# Patient Record
(demographics unavailable — no encounter records)

---

## 2024-11-06 NOTE — REVIEW OF SYSTEMS
Adult [Hearing Loss] : hearing loss [Cough] : cough [Joint Pain] : joint pain [Negative] : Heme/Lymph

## 2024-11-13 NOTE — HEALTH RISK ASSESSMENT
[No falls in past year] : Patient reported no falls in the past year [Assistive Device] : Patient uses an assistive device [With Family] : lives with family [Feels Safe at Home] : Feels safe at home [Some assistance needed] : feeding [Full assistance needed] : walking [Reports changes in hearing] : Reports changes in hearing [de-identified] : wheelchair

## 2024-11-13 NOTE — HEALTH RISK ASSESSMENT
[No falls in past year] : Patient reported no falls in the past year [Assistive Device] : Patient uses an assistive device [With Family] : lives with family [Feels Safe at Home] : Feels safe at home [Some assistance needed] : feeding [Full assistance needed] : walking [Reports changes in hearing] : Reports changes in hearing [de-identified] : wheelchair

## 2024-11-13 NOTE — HISTORY OF PRESENT ILLNESS
[Post-hospitalization from ___ Hospital] : Post-hospitalization from [unfilled] Hospital [Admitted on: ___] : The patient was admitted on [unfilled] [Discharged on ___] : discharged on [unfilled] [Discharge Summary] : discharge summary [Pertinent Labs] : pertinent labs [Radiology Findings] : radiology findings [Discharge Med List] : discharge medication list [Med Reconciliation] : medication reconciliation has been completed [Patient Contacted By: ____] : and contacted by [unfilled] [FreeTextEntry3] : [facility] STAR Hospitalization Follow up  [FreeTextEntry2] : [facility]- Transitional Care Management Team (TCM)  Mrs. Davies is an 81 y/o woman with ESRD on HD (T,TH,S) , T2DM (diet controlled), HTN and RA who presented to [facility] with nausea, vomiting, abdominal pain and diarrhea for 3 days; missing one day of HD. CXR showed bilateral infiltrates. She was admitted and treated for pneumonia and hyperkalemia. Nephrology consulted. Hospital course: K+ 8.0: calcium gluconate, sodium bicarb, and lokelma were given, as well as emergent HD. Symptoms improved on antibiotics. Discharged home on antibiotics.   Mrs. Davies was seen in the home today. Her HHA was present during the home visit. She was transported into the living room via transport chair and with no signs of distress.  Currently, she reports that she remains with a non-productive cough and one episode of diarrhea this morning, but no abdominal pain, nausea, or vomiting. Her appetite is good. Denies fever/chills, increased fatigue, chest pain, and shortness of breath. Medications reviewed: missing aspirin, renvela, and losartan. Of note, she reports having home oxygen from previous hospitalization.   she is unable to provide the names of her nephrologist or rheumatologist PCP, Dr. Villareal ([facility]) uses Good day transportation services

## 2024-11-13 NOTE — HISTORY OF PRESENT ILLNESS
[Post-hospitalization from ___ Hospital] : Post-hospitalization from [unfilled] Hospital [Admitted on: ___] : The patient was admitted on [unfilled] [Discharged on ___] : discharged on [unfilled] [Discharge Summary] : discharge summary [Pertinent Labs] : pertinent labs [Radiology Findings] : radiology findings [Discharge Med List] : discharge medication list [Med Reconciliation] : medication reconciliation has been completed [Patient Contacted By: ____] : and contacted by [unfilled] [FreeTextEntry3] : [facility] STAR Hospitalization Follow up  [FreeTextEntry2] : [facility]- Transitional Care Management Team (TCM)  Mrs. Davies is an 81 y/o woman with ESRD on HD (T,TH,S) , T2DM (diet controlled), HTN and RA who presented to [facility] with nausea, vomiting, abdominal pain and diarrhea for 3 days; missing one day of HD. CXR showed bilateral infiltrates. She was admitted and treated for pneumonia and hyperkalemia. Nephrology consulted. Hospital course: K+ 8.0: calcium gluconate, sodium bicarb, and lokelma were given, as well as emergent HD. Symptoms improved on antibiotics. Discharged home on antibiotics.   Mrs. Davies was seen in the home today. Her HHA was present during the home visit. She was transported into the living room via transport chair and with no signs of distress.  Currently, she reports that she remains with a non-productive cough and one episode of diarrhea this morning, but no abdominal pain, nausea, or vomiting. Her appetite is good. Denies fever/chills, increased fatigue, chest pain, and shortness of breath. Medications reviewed: missing aspirin, renvela, and losartan. Of note, she reports having home oxygen from previous hospitalization.   she is unable to provide the names of her nephrologist or rheumatologist PCP, Dr. Villareal ([facility]) uses Good day transportation services

## 2024-11-13 NOTE — ASSESSMENT
[FreeTextEntry1] : Mrs. Davies is an 79 y/o woman with HTN, ESRD on HD who was recently hospitalized for pneumonia. She remains clinically stable at time of home visit. Encouraged for her to make follow up appointment with PCP, Dr. Villareal.

## 2024-11-13 NOTE — PHYSICAL EXAM
[No Acute Distress] : no acute distress [Well-Appearing] : well-appearing [Normal Voice/Communication] : normal voice/communication [Normal Sclera/Conjunctiva] : normal sclera/conjunctiva [No Respiratory Distress] : no respiratory distress  [Clear to Auscultation] : lungs were clear to auscultation bilaterally [No Accessory Muscle Use] : no accessory muscle use [Normal Rate] : normal rate  [Regular Rhythm] : with a regular rhythm [Normal S1, S2] : normal S1 and S2 [Pedal Pulses Present] : the pedal pulses are present [No Edema] : there was no peripheral edema [No Extremity Clubbing/Cyanosis] : no extremity clubbing/cyanosis [Soft] : abdomen soft [Non Tender] : non-tender [Non-distended] : non-distended [Normal Bowel Sounds] : normal bowel sounds [Normal Affect] : the affect was normal [No Skin Lesions] : no skin lesions [Alert and Oriented x3] : oriented to person, place, and time [Normal Insight/Judgement] : insight and judgment were intact [Right Foot Was Examined] : Right foot ~C was examined [Left Foot Was Examined] : left foot ~C was examined [None] : no ulcers in either foot were found [de-identified] : FAUSTINO [de-identified] : RIGHT AVF +THRILL +BRUIT

## 2024-11-13 NOTE — PLAN
[FreeTextEntry1] : The patient/family was informed about the NP's role/STAR program and an overview of transitional care was reviewed with the patient. Patient/family educated on topics of importance such as compliance with all provider visits, prescribed medication regimen, and low salt/heart-healthy diet. Patient/Family encouraged to call NP with any issues, concerns or questions, also educated to notify NP if experiencing CP, SOB, cough, increased mucus/phlegm production, abdominal discomfort/swelling, difficulty sleeping or lying flat, fever, chills, fatigue, weight gain of 2-3lbs in 24 hours or 5lbs in one week, dizziness, lightheadedness, n/v/d/c, swelling to extremities and/or any c/o or concerns. Reassurance provided.

## 2024-11-13 NOTE — PHYSICAL EXAM
[No Acute Distress] : no acute distress [Well-Appearing] : well-appearing [Normal Voice/Communication] : normal voice/communication [Normal Sclera/Conjunctiva] : normal sclera/conjunctiva [No Respiratory Distress] : no respiratory distress  [Clear to Auscultation] : lungs were clear to auscultation bilaterally [No Accessory Muscle Use] : no accessory muscle use [Normal Rate] : normal rate  [Regular Rhythm] : with a regular rhythm [Normal S1, S2] : normal S1 and S2 [Pedal Pulses Present] : the pedal pulses are present [No Edema] : there was no peripheral edema [No Extremity Clubbing/Cyanosis] : no extremity clubbing/cyanosis [Soft] : abdomen soft [Non Tender] : non-tender [Non-distended] : non-distended [Normal Bowel Sounds] : normal bowel sounds [No Skin Lesions] : no skin lesions [Normal Affect] : the affect was normal [Alert and Oriented x3] : oriented to person, place, and time [Normal Insight/Judgement] : insight and judgment were intact [Right Foot Was Examined] : Right foot ~C was examined [Left Foot Was Examined] : left foot ~C was examined [None] : no ulcers in either foot were found [de-identified] : FAUSTINO [de-identified] : RIGHT AVF +THRILL +BRUIT

## 2025-02-03 NOTE — HISTORY OF PRESENT ILLNESS
[de-identified] : 79yo female with hearing loss. Notes about 6 months ago she realized she wasnt hearing in the right ear at all. The left hearing seems a bit down too. Also with ringing in both ears.

## 2025-02-03 NOTE — PHYSICAL EXAM
[Normal] : tympanic membranes are normal in both ears [de-identified] : left mod cerumen, right severe impaction

## 2025-02-03 NOTE — ASSESSMENT
[FreeTextEntry1] : cerumen impaction: - removed - cortisporin gtt BID for 5 days for irritation  SNHL: - audio with SNHL - recommend [facility]AE - given clearance and list of vendors

## 2025-02-03 NOTE — REASON FOR VISIT
[Subsequent Evaluation] : a subsequent evaluation for [FreeTextEntry2] : Hearing loss [Formal Caregiver] : formal caregiver